# Patient Record
(demographics unavailable — no encounter records)

---

## 2025-01-28 NOTE — HISTORY OF PRESENT ILLNESS
[FreeTextEntry1] : 65y/o male here today for frequent urination and urgency The patient states that this issue started in 2022. Nocturia X4, dysuria, denies hematuria, flank pain, or any other urinary issues SIMRAN Occasionally eating spicy, citrus, chocolate. Not adequately hydrating   FHx of PCA: negative Tobacco use: occasional Last PSA:  ng/mL Last creatinine:  mg/dL Last UCx:  Uro meds: None

## 2025-01-28 NOTE — PHYSICAL EXAM
[Normal Appearance] : normal appearance [Well Groomed] : well groomed [General Appearance - In No Acute Distress] : no acute distress [Edema] : no peripheral edema [Respiration, Rhythm And Depth] : normal respiratory rhythm and effort [Exaggerated Use Of Accessory Muscles For Inspiration] : no accessory muscle use [Abdomen Soft] : soft [Abdomen Tenderness] : non-tender [Costovertebral Angle Tenderness] : no ~M costovertebral angle tenderness [Urinary Bladder Findings] : the bladder was normal on palpation [Normal Station and Gait] : the gait and station were normal for the patient's age [] : no rash [No Focal Deficits] : no focal deficits [Oriented To Time, Place, And Person] : oriented to person, place, and time [Affect] : the affect was normal [Mood] : the mood was normal [No Palpable Adenopathy] : no palpable adenopathy [de-identified] : Kidney percussion test negative bilaterally, no pain reported on bi-manual palpation bilaterally, no pain on superficial and deep palpation on the iliac fossa bilaterally and on the ureteral points

## 2025-01-28 NOTE — ASSESSMENT
[FreeTextEntry1] : 65y/o male here today for frequent urination and urgency The patient states that this issue started in 2022. Nocturia X4, dysuria, denies hematuria, flank pain, or any other urinary issues SIMRAN Occasionally eating spicy, citrus, chocolate. Not adequately hydrating   FHx of PCA: negative Tobacco use: occasional Last PSA:  ng/mL Last creatinine:  mg/dL Last UCx:  Uro meds: None   Kidney percussion test negative bilaterally, no pain reported on bi-manual palpation bilaterally, no pain on superficial and deep palpation on the iliac fossa bilaterally and on the ureteral points   Blood draw for PSA + Renal Panel Collecting urine for UA and Culture  Ordering TRUS Prescribing Tamsulosin 0.4 Will F/U to discuss results and planning

## 2025-02-18 NOTE — HISTORY OF PRESENT ILLNESS
[FreeTextEntry1] : 63y/o male here today for frequent urination and urgency. Here for TRUS The patient states that this issue started in 2022. Nocturia X4, dysuria, denies hematuria, flank pain, or any other urinary issues SIMRAN Occasionally eating spicy, citrus, chocolate. Not adequately hydrating   FHx of PCA: negative Tobacco use: occasional Last PSA: 3.00ng/mL (01/28/2025) Last creatinine: 0.96mg/dL (01/28/2025) Last UCx: negative (01/28/2025) Uro meds: Tamsulosin 0.4   Kidney percussion test negative bilaterally, no pain reported on bi-manual palpation bilaterally, no pain on superficial and deep palpation on the iliac fossa bilaterally and on the ureteral points

## 2025-02-18 NOTE — PHYSICAL EXAM
[Normal Appearance] : normal appearance [Well Groomed] : well groomed [General Appearance - In No Acute Distress] : no acute distress [Edema] : no peripheral edema [Respiration, Rhythm And Depth] : normal respiratory rhythm and effort [Exaggerated Use Of Accessory Muscles For Inspiration] : no accessory muscle use [Abdomen Soft] : soft [Abdomen Tenderness] : non-tender [Costovertebral Angle Tenderness] : no ~M costovertebral angle tenderness [Urinary Bladder Findings] : the bladder was normal on palpation [Normal Station and Gait] : the gait and station were normal for the patient's age [] : no rash [No Focal Deficits] : no focal deficits [Oriented To Time, Place, And Person] : oriented to person, place, and time [Affect] : the affect was normal [Mood] : the mood was normal [No Palpable Adenopathy] : no palpable adenopathy [de-identified] : Kidney percussion test negative bilaterally, no pain reported on bi-manual palpation bilaterally, no pain on superficial and deep palpation on the iliac fossa bilaterally and on the ureteral points

## 2025-02-18 NOTE — ASSESSMENT
[FreeTextEntry1] : 65y/o male here today for frequent urination and urgency. Here for TRUS The patient states that this issue started in 2022. Nocturia X4, dysuria, denies hematuria, flank pain, or any other urinary issues SIMRAN Occasionally eating spicy, citrus, chocolate. Not adequately hydrating   FHx of PCA: negative Tobacco use: occasional Last PSA: 3.00ng/mL (01/28/2025) Last creatinine: 0.96mg/dL (01/28/2025) Last UCx: negative (01/28/2025) Uro meds: Tamsulosin 0.4   Kidney percussion test negative bilaterally, no pain reported on bi-manual palpation bilaterally, no pain on superficial and deep palpation on the iliac fossa bilaterally and on the ureteral points  02/18/2025 - TRUS Prostate Volume: 44 cc Impression: patient stated pain during examination  Patient to start Dutasteride + Tamsulosin. Explained to the patient that dutasteride could cause loss of sexual desire and could be correlated to a decreased sensitivity of exams to prostate cancer (eg.: PSA). Tamsulosin could cause hypotension and has to be taken before bed. Will F/U in 6 months for uroflow + US prostate to check for any improvement.